# Patient Record
Sex: MALE
[De-identification: names, ages, dates, MRNs, and addresses within clinical notes are randomized per-mention and may not be internally consistent; named-entity substitution may affect disease eponyms.]

---

## 2021-06-22 PROBLEM — Z00.00 ENCOUNTER FOR PREVENTIVE HEALTH EXAMINATION: Status: ACTIVE | Noted: 2021-06-22

## 2021-06-29 ENCOUNTER — NON-APPOINTMENT (OUTPATIENT)
Age: 26
End: 2021-06-29

## 2021-06-29 ENCOUNTER — APPOINTMENT (OUTPATIENT)
Dept: ENDOCRINOLOGY | Facility: CLINIC | Age: 26
End: 2021-06-29
Payer: MEDICAID

## 2021-06-29 VITALS
OXYGEN SATURATION: 97 % | HEIGHT: 69 IN | HEART RATE: 85 BPM | BODY MASS INDEX: 26.07 KG/M2 | DIASTOLIC BLOOD PRESSURE: 72 MMHG | SYSTOLIC BLOOD PRESSURE: 124 MMHG | WEIGHT: 176 LBS | TEMPERATURE: 97.7 F | RESPIRATION RATE: 16 BRPM

## 2021-06-29 DIAGNOSIS — E06.3 OTHER SPECIFIED HYPOTHYROIDISM: ICD-10-CM

## 2021-06-29 DIAGNOSIS — E03.8 OTHER SPECIFIED HYPOTHYROIDISM: ICD-10-CM

## 2021-06-29 PROCEDURE — 99203 OFFICE O/P NEW LOW 30 MIN: CPT

## 2021-06-29 NOTE — ASSESSMENT
[Other____] : Risks and benefits of [unfilled] was discussed with the patient. [FreeTextEntry1] : 1- Will get original med records\par 2- TFT and Prolactin\par 3- I counciled the pt on the risks of infertility on testosterone tx\par 4- ret in 6 mo

## 2021-06-29 NOTE — HISTORY OF PRESENT ILLNESS
[FreeTextEntry1] : 26 yoM with a 9 mo hx of Hypogonadism- on Depotestosterone-0.4 cc biw and HCG- given by a  [facility]- [facility]\par He had poor erections and no sex drive\par After on meds , he had mor energy, nl erections and sex drive and better mood\par He has not noticed any change in size  of testicles, or loss of pubic hair\par No hx of pituitary disease.\par Has a hx of Hashimotos- not on T4\par + FH of Hashimotos

## 2021-06-29 NOTE — REVIEW OF SYSTEMS
[Negative] : Heme/Lymph [Visual Field Defect] : no visual field defect [Palpitations] : no palpitations [Erectile Dysfunction] : no erectile dysfunction [Decreased Libido] : no decreased libido

## 2021-06-29 NOTE — PHYSICAL EXAM
[Alert] : alert [No Acute Distress] : no acute distress [PERRL] : pupils equal, round and reactive to light

## 2021-08-03 LAB
PROLACTIN SERPL-MCNC: 13.1 NG/ML
THYROGLOB AB SERPL-ACNC: 59.8 IU/ML
THYROPEROXIDASE AB SERPL IA-ACNC: 28.8 IU/ML
TSH SERPL-ACNC: 1.56 UIU/ML

## 2021-11-11 ENCOUNTER — NON-APPOINTMENT (OUTPATIENT)
Age: 26
End: 2021-11-11

## 2021-12-21 ENCOUNTER — APPOINTMENT (OUTPATIENT)
Dept: ENDOCRINOLOGY | Facility: CLINIC | Age: 26
End: 2021-12-21
Payer: MEDICAID

## 2021-12-21 VITALS
TEMPERATURE: 98.1 F | WEIGHT: 171 LBS | OXYGEN SATURATION: 97 % | BODY MASS INDEX: 25.33 KG/M2 | HEIGHT: 69 IN | SYSTOLIC BLOOD PRESSURE: 102 MMHG | DIASTOLIC BLOOD PRESSURE: 78 MMHG | RESPIRATION RATE: 15 BRPM

## 2021-12-21 PROCEDURE — 99213 OFFICE O/P EST LOW 20 MIN: CPT

## 2021-12-21 NOTE — HISTORY OF PRESENT ILLNESS
[FreeTextEntry1] : 26 yoM with a 9 mo hx of Hypogonadism- on Depotestosterone-0.4 cc biw and HCG- given by a  [facility] clinic- [facility]\par He had poor erections and no sex drive\par After on meds , he had mor energy, nl erections and sex drive and better mood\par He has not noticed any change in size  of testicles, or loss of pubic hair\par No hx of pituitary disease.\par Has a hx of Hashimotos- not on T4\par + FH of Hashimotos- mother, brother\par Here for f/u of thyroid

## 2021-12-21 NOTE — REVIEW OF SYSTEMS
[Recent Weight Loss (___ Lbs)] : recent weight loss: [unfilled] lbs [Negative] : Heme/Lymph [Fatigue] : no fatigue [Visual Field Defect] : no visual field defect [Palpitations] : no palpitations [Nausea] : no nausea [Vomiting] : no vomiting [Diarrhea] : no diarrhea [Erectile Dysfunction] : no erectile dysfunction [Decreased Libido] : no decreased libido [Muscle Weakness] : no muscle weakness [Tremors] : no tremors [Depression] : no depression

## 2021-12-21 NOTE — PHYSICAL EXAM
[Alert] : alert [No Acute Distress] : no acute distress [PERRL] : pupils equal, round and reactive to light [Normal Rate] : heart rate was normal [Regular Rhythm] : with a regular rhythm [Gynecomastia] : gynecomastia present [Soft] : abdomen soft [Normal Gait] : normal gait [No Joint Swelling] : no joint swelling seen [No Rash] : no rash [Kyphosis] : no kyphosis present

## 2021-12-21 NOTE — ASSESSMENT
[Other____] : Risks and benefits of [unfilled] was discussed with the patient. [FreeTextEntry1] : 1- Will get original med records\par 2- TFT drawn\par 3- I counciled the pt on the risks of infertility on testosterone tx\par 4- ret in 6 mo- f/u om Hashimotos

## 2021-12-22 LAB
T4 FREE SERPL-MCNC: 1.3 NG/DL
TSH SERPL-ACNC: 1.92 UIU/ML

## 2024-07-28 PROBLEM — E06.3 HYPOTHYROIDISM DUE TO HASHIMOTO'S THYROIDITIS: Status: ACTIVE | Noted: 2021-06-29

## 2025-06-02 ENCOUNTER — NON-APPOINTMENT (OUTPATIENT)
Age: 30
End: 2025-06-02